# Patient Record
Sex: MALE
[De-identification: names, ages, dates, MRNs, and addresses within clinical notes are randomized per-mention and may not be internally consistent; named-entity substitution may affect disease eponyms.]

---

## 2017-03-02 ENCOUNTER — APPOINTMENT (OUTPATIENT)
Dept: COLORECTAL SURGERY | Facility: CLINIC | Age: 82
End: 2017-03-02

## 2017-03-02 VITALS
HEIGHT: 67 IN | HEART RATE: 71 BPM | BODY MASS INDEX: 25.74 KG/M2 | SYSTOLIC BLOOD PRESSURE: 120 MMHG | WEIGHT: 164 LBS | TEMPERATURE: 97.7 F | DIASTOLIC BLOOD PRESSURE: 64 MMHG

## 2017-03-02 DIAGNOSIS — E10.641 TYPE 1 DIABETES MELLITUS WITH HYPOGLYCEMIA WITH COMA: ICD-10-CM

## 2017-03-02 DIAGNOSIS — Z87.19 PERSONAL HISTORY OF OTHER DISEASES OF THE DIGESTIVE SYSTEM: ICD-10-CM

## 2017-03-02 DIAGNOSIS — Z92.89 PERSONAL HISTORY OF OTHER MEDICAL TREATMENT: ICD-10-CM

## 2017-03-02 DIAGNOSIS — Z87.39 PERSONAL HISTORY OF OTHER DISEASES OF THE MUSCULOSKELETAL SYSTEM AND CONNECTIVE TISSUE: ICD-10-CM

## 2017-03-02 DIAGNOSIS — Z83.79 FAMILY HISTORY OF OTHER DISEASES OF THE DIGESTIVE SYSTEM: ICD-10-CM

## 2017-03-02 DIAGNOSIS — Z78.9 OTHER SPECIFIED HEALTH STATUS: ICD-10-CM

## 2017-03-10 PROBLEM — Z87.19 HISTORY OF DIVERTICULITIS OF COLON: Status: RESOLVED | Noted: 2017-03-02 | Resolved: 2017-03-10

## 2017-03-10 PROBLEM — E10.641 TYPE 1 DIABETES MELLITUS WITH HYPOGLYCEMIC COMA: Status: RESOLVED | Noted: 2017-03-02 | Resolved: 2017-03-10

## 2017-03-10 PROBLEM — Z87.19 HISTORY OF HEMORRHOIDS: Status: RESOLVED | Noted: 2017-03-02 | Resolved: 2017-03-10

## 2017-03-10 PROBLEM — Z87.19 HISTORY OF ESOPHAGEAL REFLUX: Status: RESOLVED | Noted: 2017-03-02 | Resolved: 2017-03-10

## 2017-03-10 PROBLEM — Z92.89 HISTORY OF BLOOD TRANSFUSION: Status: RESOLVED | Noted: 2017-03-02 | Resolved: 2017-03-10

## 2017-03-10 PROBLEM — Z87.19 HISTORY OF IRRITABLE BOWEL SYNDROME: Status: RESOLVED | Noted: 2017-03-02 | Resolved: 2017-03-10

## 2017-03-10 PROBLEM — Z83.79 FAMILY HISTORY OF COLITIS: Status: ACTIVE | Noted: 2017-03-02

## 2017-03-10 PROBLEM — Z87.39 HISTORY OF ARTHRITIS: Status: RESOLVED | Noted: 2017-03-02 | Resolved: 2017-03-10

## 2017-03-10 RX ORDER — ALLOPURINOL 100 MG/1
100 TABLET ORAL
Refills: 0 | Status: ACTIVE | COMMUNITY

## 2017-03-10 RX ORDER — MECLIZINE HYDROCHLORIDE 12.5 MG/1
12.5 TABLET ORAL
Refills: 0 | Status: ACTIVE | COMMUNITY

## 2017-03-10 RX ORDER — TAMSULOSIN HYDROCHLORIDE 0.4 MG/1
0.4 CAPSULE ORAL
Refills: 0 | Status: ACTIVE | COMMUNITY

## 2017-03-10 RX ORDER — CHLORDIAZEPOXIDE HYDROCHLORIDE AND CLIDINIUM BROMIDE 5; 2.5 MG/1; MG/1
5-2.5 CAPSULE ORAL
Refills: 0 | Status: ACTIVE | COMMUNITY

## 2017-03-10 RX ORDER — OMEPRAZOLE 20 MG/1
20 CAPSULE, DELAYED RELEASE ORAL
Refills: 0 | Status: ACTIVE | COMMUNITY

## 2017-03-10 RX ORDER — MULTIVITAMIN
TABLET ORAL
Refills: 0 | Status: ACTIVE | COMMUNITY

## 2017-03-10 RX ORDER — METHOCARBAMOL 750 MG/1
750 TABLET, FILM COATED ORAL
Refills: 0 | Status: ACTIVE | COMMUNITY

## 2017-03-10 RX ORDER — MELOXICAM 15 MG/1
15 TABLET ORAL
Refills: 0 | Status: ACTIVE | COMMUNITY

## 2017-03-10 RX ORDER — ROSUVASTATIN CALCIUM 5 MG/1
5 TABLET, FILM COATED ORAL
Refills: 0 | Status: ACTIVE | COMMUNITY

## 2017-03-28 ENCOUNTER — APPOINTMENT (OUTPATIENT)
Dept: COLORECTAL SURGERY | Facility: CLINIC | Age: 82
End: 2017-03-28

## 2017-03-28 VITALS
HEIGHT: 67 IN | DIASTOLIC BLOOD PRESSURE: 72 MMHG | HEART RATE: 65 BPM | BODY MASS INDEX: 25.74 KG/M2 | TEMPERATURE: 97.4 F | WEIGHT: 164 LBS | SYSTOLIC BLOOD PRESSURE: 146 MMHG

## 2017-03-28 DIAGNOSIS — K64.8 OTHER HEMORRHOIDS: ICD-10-CM

## 2017-03-28 DIAGNOSIS — K62.5 HEMORRHAGE OF ANUS AND RECTUM: ICD-10-CM

## 2017-03-28 DIAGNOSIS — K62.89 OTHER SPECIFIED DISEASES OF ANUS AND RECTUM: ICD-10-CM

## 2017-03-30 PROBLEM — K62.89 RECTAL PAIN: Status: ACTIVE | Noted: 2017-03-10

## 2017-03-30 PROBLEM — K62.5 RECTAL BLEEDING: Status: ACTIVE | Noted: 2017-03-10

## 2017-03-30 PROBLEM — K64.8 INTERNAL HEMORRHOIDS WITH OTHER COMPLICATION: Status: ACTIVE | Noted: 2017-03-10

## 2018-05-31 ENCOUNTER — TRANSCRIPTION ENCOUNTER (OUTPATIENT)
Age: 83
End: 2018-05-31

## 2018-07-26 PROBLEM — Z78.9 ALCOHOL USE: Status: ACTIVE | Noted: 2017-03-02

## 2018-09-18 ENCOUNTER — APPOINTMENT (OUTPATIENT)
Dept: VASCULAR SURGERY | Facility: CLINIC | Age: 83
End: 2018-09-18
Payer: MEDICARE

## 2018-09-18 VITALS
HEIGHT: 67 IN | HEART RATE: 69 BPM | SYSTOLIC BLOOD PRESSURE: 150 MMHG | TEMPERATURE: 98 F | BODY MASS INDEX: 25.74 KG/M2 | DIASTOLIC BLOOD PRESSURE: 77 MMHG | WEIGHT: 164 LBS

## 2018-09-18 DIAGNOSIS — Z80.1 FAMILY HISTORY OF MALIGNANT NEOPLASM OF TRACHEA, BRONCHUS AND LUNG: ICD-10-CM

## 2018-09-18 DIAGNOSIS — K57.90 DIVERTICULOSIS OF INTESTINE, PART UNSPECIFIED, W/OUT PERFORATION OR ABSCESS W/OUT BLEEDING: ICD-10-CM

## 2018-09-18 DIAGNOSIS — Z82.49 FAMILY HISTORY OF ISCHEMIC HEART DISEASE AND OTHER DISEASES OF THE CIRCULATORY SYSTEM: ICD-10-CM

## 2018-09-18 DIAGNOSIS — K80.20 CALCULUS OF GALLBLADDER W/OUT CHOLECYSTITIS W/OUT OBSTRUCTION: ICD-10-CM

## 2018-09-18 PROCEDURE — 99202 OFFICE O/P NEW SF 15 MIN: CPT

## 2019-03-19 ENCOUNTER — APPOINTMENT (OUTPATIENT)
Dept: VASCULAR SURGERY | Facility: CLINIC | Age: 84
End: 2019-03-19
Payer: MEDICARE

## 2019-03-19 VITALS
SYSTOLIC BLOOD PRESSURE: 128 MMHG | HEIGHT: 67 IN | DIASTOLIC BLOOD PRESSURE: 76 MMHG | BODY MASS INDEX: 25.27 KG/M2 | WEIGHT: 161 LBS | HEART RATE: 64 BPM | TEMPERATURE: 97.6 F

## 2019-03-19 DIAGNOSIS — Z00.00 ENCOUNTER FOR GENERAL ADULT MEDICAL EXAMINATION W/OUT ABNORMAL FINDINGS: ICD-10-CM

## 2019-03-19 PROCEDURE — 99213 OFFICE O/P EST LOW 20 MIN: CPT

## 2019-03-19 PROCEDURE — 93978 VASCULAR STUDY: CPT

## 2019-03-19 NOTE — ASSESSMENT
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

## 2019-03-19 NOTE — HISTORY OF PRESENT ILLNESS
[de-identified] : 89 yo male here for follow up of asymptomatic 4.9 cm  AAA. Doing well. \par Recently underwent  Abdominal US at [facility] revealing bilateral non-obstructive kidney stones which are now being managed non-operatively.

## 2019-09-24 ENCOUNTER — APPOINTMENT (OUTPATIENT)
Dept: VASCULAR SURGERY | Facility: CLINIC | Age: 84
End: 2019-09-24
Payer: MEDICARE

## 2019-09-24 VITALS
HEIGHT: 67 IN | TEMPERATURE: 97.6 F | BODY MASS INDEX: 23.86 KG/M2 | WEIGHT: 152 LBS | HEART RATE: 56 BPM | DIASTOLIC BLOOD PRESSURE: 76 MMHG | SYSTOLIC BLOOD PRESSURE: 162 MMHG

## 2019-09-24 PROCEDURE — 93978 VASCULAR STUDY: CPT

## 2019-09-24 PROCEDURE — 99213 OFFICE O/P EST LOW 20 MIN: CPT

## 2019-09-24 NOTE — HISTORY OF PRESENT ILLNESS
[FreeTextEntry1] : Asymptomatic  Known AAA grown in size from  4.1 cm  in  2017  to  4,9 cm  in March  Patient  is  independent  Functions  well  depite  his  advanced  age

## 2019-09-24 NOTE — ASSESSMENT
[FreeTextEntry1] : US  today shows  growth  to  5.2 cm  He  also  has  a 3 cm Hypogastric  aneurysm  I discussed  also  options  of  observation  , No  treatment  and EVAR  He is  not  a open  surgical  candidate  He  and  his  daughter  are going  to discuss   For nowplan to do CTA  for more  accurate  measurement  [Aneurysm Surgery] : aneurysm surgery

## 2019-10-01 ENCOUNTER — MEDICATION RENEWAL (OUTPATIENT)
Age: 84
End: 2019-10-01

## 2019-10-01 RX ORDER — METHYLPREDNISOLONE 32 MG/1
32 TABLET ORAL
Qty: 2 | Refills: 0 | Status: ACTIVE | COMMUNITY
Start: 2019-09-24 | End: 1900-01-01

## 2019-10-02 ENCOUNTER — OUTPATIENT (OUTPATIENT)
Dept: OUTPATIENT SERVICES | Facility: HOSPITAL | Age: 84
LOS: 1 days | End: 2019-10-02
Payer: MEDICARE

## 2019-10-02 ENCOUNTER — APPOINTMENT (OUTPATIENT)
Dept: CT IMAGING | Facility: CLINIC | Age: 84
End: 2019-10-02
Payer: MEDICARE

## 2019-10-02 DIAGNOSIS — Z00.8 ENCOUNTER FOR OTHER GENERAL EXAMINATION: ICD-10-CM

## 2019-10-02 PROCEDURE — 82565 ASSAY OF CREATININE: CPT

## 2019-10-02 PROCEDURE — 74174 CTA ABD&PLVS W/CONTRAST: CPT

## 2019-10-02 PROCEDURE — 74174 CTA ABD&PLVS W/CONTRAST: CPT | Mod: 26

## 2020-04-07 ENCOUNTER — APPOINTMENT (OUTPATIENT)
Dept: VASCULAR SURGERY | Facility: CLINIC | Age: 85
End: 2020-04-07

## 2020-10-21 ENCOUNTER — APPOINTMENT (OUTPATIENT)
Dept: VASCULAR SURGERY | Facility: CLINIC | Age: 85
End: 2020-10-21
Payer: MEDICARE

## 2020-10-21 VITALS
SYSTOLIC BLOOD PRESSURE: 148 MMHG | HEART RATE: 70 BPM | DIASTOLIC BLOOD PRESSURE: 81 MMHG | TEMPERATURE: 97.3 F | WEIGHT: 146 LBS | OXYGEN SATURATION: 96 % | BODY MASS INDEX: 22.91 KG/M2 | HEIGHT: 67 IN

## 2020-10-21 DIAGNOSIS — R60.0 LOCALIZED EDEMA: ICD-10-CM

## 2020-10-21 DIAGNOSIS — Z86.79 PERSONAL HISTORY OF OTHER DISEASES OF THE CIRCULATORY SYSTEM: ICD-10-CM

## 2020-10-21 DIAGNOSIS — I71.4 ABDOMINAL AORTIC ANEURYSM, W/OUT RUPTURE: ICD-10-CM

## 2020-10-21 DIAGNOSIS — E11.9 TYPE 2 DIABETES MELLITUS W/OUT COMPLICATIONS: ICD-10-CM

## 2020-10-21 PROCEDURE — 93970 EXTREMITY STUDY: CPT

## 2020-10-21 PROCEDURE — 93978 VASCULAR STUDY: CPT

## 2020-10-21 PROCEDURE — 99213 OFFICE O/P EST LOW 20 MIN: CPT

## 2020-10-21 NOTE — HISTORY OF PRESENT ILLNESS
[FreeTextEntry1] : Had  a recent fall and Hip surgery  He was in rehab and recovered  CoVID negative  He has  a known AAA  It was 4.8 cm in  October 2019   He also has  recent  Edema of the legs  No pain  He is on Na Cl tablets

## 2020-10-21 NOTE — ASSESSMENT
[FreeTextEntry1] : Plan US  for AAA and  Venous duplex  to rule out DVT   No DVT  Patient has  same size AAA on US  he does not want any intervention  I  suggested not  to  return in that case of  no  interventions  risk of rupture discussed He  says  whatever happens  as  long as it is  quick  he does not want interventions  for the AAA [Aneurysm Surgery] : aneurysm surgery

## 2020-10-21 NOTE — REASON FOR VISIT
[Follow-Up: _____] : a [unfilled] follow-up visit [Family Member] : family member [FreeTextEntry1] : AAA  and leg swelling

## 2020-10-21 NOTE — PHYSICAL EXAM
[1+] : left 1+ [Ankle Swelling (On Exam)] : present [Ankle Swelling Bilaterally] : bilaterally  [] : bilaterally [Ankle Swelling On The Left] : moderate